# Patient Record
Sex: FEMALE | Race: WHITE | NOT HISPANIC OR LATINO | Employment: OTHER | URBAN - METROPOLITAN AREA
[De-identification: names, ages, dates, MRNs, and addresses within clinical notes are randomized per-mention and may not be internally consistent; named-entity substitution may affect disease eponyms.]

---

## 2023-09-16 PROBLEM — I87.329 STASIS DERMATITIS OF LOWER EXTREMITY DUE TO PERIPHERAL VENOUS HYPERTENSION: Status: ACTIVE | Noted: 2023-09-16

## 2023-09-16 PROBLEM — I82.409 DVT (DEEP VENOUS THROMBOSIS) (MULTI): Status: ACTIVE | Noted: 2023-09-16

## 2023-09-16 PROBLEM — I26.99 PULMONARY EMBOLISM (MULTI): Status: ACTIVE | Noted: 2023-09-16

## 2023-09-16 PROBLEM — M79.89 LEFT LEG SWELLING: Status: ACTIVE | Noted: 2023-09-16

## 2023-09-16 PROBLEM — E66.9 OBESITY: Status: ACTIVE | Noted: 2023-09-16

## 2023-09-16 PROBLEM — I80.9 PHLEBITIS: Status: ACTIVE | Noted: 2023-09-16

## 2023-09-16 PROBLEM — M71.20 BAKERS CYST: Status: ACTIVE | Noted: 2023-09-16

## 2023-09-16 RX ORDER — DOXYCYCLINE HYCLATE 100 MG
100 TABLET ORAL EVERY 12 HOURS
COMMUNITY
Start: 2020-02-07 | End: 2023-10-23

## 2023-09-16 RX ORDER — RIVAROXABAN 20 MG/1
1 TABLET, FILM COATED ORAL DAILY
COMMUNITY
Start: 2019-02-07 | End: 2024-01-05

## 2023-09-16 RX ORDER — ACETAMINOPHEN 325 MG/1
650 TABLET ORAL EVERY 4 HOURS PRN
COMMUNITY
Start: 2020-02-07

## 2023-09-16 RX ORDER — MULTIVITAMIN
0.5 TABLET ORAL 2 TIMES DAILY
COMMUNITY

## 2023-10-13 DIAGNOSIS — M71.20 SYNOVIAL CYST OF POPLITEAL SPACE, UNSPECIFIED LATERALITY: ICD-10-CM

## 2023-10-13 DIAGNOSIS — I87.329 STASIS DERMATITIS OF LOWER EXTREMITY DUE TO PERIPHERAL VENOUS HYPERTENSION: ICD-10-CM

## 2023-10-23 ENCOUNTER — HOSPITAL ENCOUNTER (EMERGENCY)
Facility: HOSPITAL | Age: 83
Discharge: HOME | End: 2023-10-23
Payer: MEDICARE

## 2023-10-23 ENCOUNTER — APPOINTMENT (OUTPATIENT)
Dept: VASCULAR MEDICINE | Facility: HOSPITAL | Age: 83
End: 2023-10-23
Payer: MEDICARE

## 2023-10-23 VITALS
HEIGHT: 62 IN | BODY MASS INDEX: 31.65 KG/M2 | OXYGEN SATURATION: 97 % | HEART RATE: 62 BPM | TEMPERATURE: 98.1 F | RESPIRATION RATE: 18 BRPM | WEIGHT: 172 LBS | SYSTOLIC BLOOD PRESSURE: 188 MMHG | DIASTOLIC BLOOD PRESSURE: 84 MMHG

## 2023-10-23 DIAGNOSIS — S81.801A WOUND OF RIGHT LOWER EXTREMITY, INITIAL ENCOUNTER: Primary | ICD-10-CM

## 2023-10-23 DIAGNOSIS — M79.662 PAIN IN LEFT LOWER LEG: ICD-10-CM

## 2023-10-23 LAB
ALBUMIN SERPL BCP-MCNC: 4.2 G/DL (ref 3.4–5)
ALP SERPL-CCNC: 80 U/L (ref 33–136)
ALT SERPL W P-5'-P-CCNC: 16 U/L (ref 7–45)
ANION GAP SERPL CALC-SCNC: 11 MMOL/L (ref 10–20)
AST SERPL W P-5'-P-CCNC: 18 U/L (ref 9–39)
BASOPHILS # BLD AUTO: 0.06 X10*3/UL (ref 0–0.1)
BASOPHILS NFR BLD AUTO: 1 %
BILIRUB SERPL-MCNC: 0.6 MG/DL (ref 0–1.2)
BUN SERPL-MCNC: 10 MG/DL (ref 6–23)
CALCIUM SERPL-MCNC: 9.5 MG/DL (ref 8.6–10.3)
CHLORIDE SERPL-SCNC: 102 MMOL/L (ref 98–107)
CO2 SERPL-SCNC: 30 MMOL/L (ref 21–32)
CREAT SERPL-MCNC: 0.66 MG/DL (ref 0.5–1.05)
EOSINOPHIL # BLD AUTO: 0.19 X10*3/UL (ref 0–0.4)
EOSINOPHIL NFR BLD AUTO: 3 %
ERYTHROCYTE [DISTWIDTH] IN BLOOD BY AUTOMATED COUNT: 14 % (ref 11.5–14.5)
GFR SERPL CREATININE-BSD FRML MDRD: 88 ML/MIN/1.73M*2
GLUCOSE SERPL-MCNC: 95 MG/DL (ref 74–99)
HCT VFR BLD AUTO: 43.2 % (ref 36–46)
HGB BLD-MCNC: 14.1 G/DL (ref 12–16)
IMM GRANULOCYTES # BLD AUTO: 0.04 X10*3/UL (ref 0–0.5)
IMM GRANULOCYTES NFR BLD AUTO: 0.6 % (ref 0–0.9)
LACTATE SERPL-SCNC: 1.2 MMOL/L (ref 0.4–2)
LYMPHOCYTES # BLD AUTO: 1.01 X10*3/UL (ref 0.8–3)
LYMPHOCYTES NFR BLD AUTO: 16 %
MCH RBC QN AUTO: 30.4 PG (ref 26–34)
MCHC RBC AUTO-ENTMCNC: 32.6 G/DL (ref 32–36)
MCV RBC AUTO: 93 FL (ref 80–100)
MONOCYTES # BLD AUTO: 0.54 X10*3/UL (ref 0.05–0.8)
MONOCYTES NFR BLD AUTO: 8.6 %
NEUTROPHILS # BLD AUTO: 4.46 X10*3/UL (ref 1.6–5.5)
NEUTROPHILS NFR BLD AUTO: 70.8 %
NRBC BLD-RTO: 0 /100 WBCS (ref 0–0)
PLATELET # BLD AUTO: 312 X10*3/UL (ref 150–450)
PMV BLD AUTO: 8.9 FL (ref 7.5–11.5)
POTASSIUM SERPL-SCNC: 4.1 MMOL/L (ref 3.5–5.3)
PROT SERPL-MCNC: 6.7 G/DL (ref 6.4–8.2)
RBC # BLD AUTO: 4.64 X10*6/UL (ref 4–5.2)
SODIUM SERPL-SCNC: 139 MMOL/L (ref 136–145)
WBC # BLD AUTO: 6.3 X10*3/UL (ref 4.4–11.3)

## 2023-10-23 PROCEDURE — 99284 EMERGENCY DEPT VISIT MOD MDM: CPT | Mod: 25

## 2023-10-23 PROCEDURE — 87040 BLOOD CULTURE FOR BACTERIA: CPT | Mod: CMCLAB,PARLAB | Performed by: NURSE PRACTITIONER

## 2023-10-23 PROCEDURE — 96365 THER/PROPH/DIAG IV INF INIT: CPT

## 2023-10-23 PROCEDURE — 2500000004 HC RX 250 GENERAL PHARMACY W/ HCPCS (ALT 636 FOR OP/ED): Performed by: NURSE PRACTITIONER

## 2023-10-23 PROCEDURE — 36415 COLL VENOUS BLD VENIPUNCTURE: CPT | Performed by: NURSE PRACTITIONER

## 2023-10-23 PROCEDURE — 85025 COMPLETE CBC W/AUTO DIFF WBC: CPT | Performed by: NURSE PRACTITIONER

## 2023-10-23 PROCEDURE — 96367 TX/PROPH/DG ADDL SEQ IV INF: CPT

## 2023-10-23 PROCEDURE — 83605 ASSAY OF LACTIC ACID: CPT | Performed by: NURSE PRACTITIONER

## 2023-10-23 PROCEDURE — 96361 HYDRATE IV INFUSION ADD-ON: CPT

## 2023-10-23 PROCEDURE — 93971 EXTREMITY STUDY: CPT | Performed by: SURGERY

## 2023-10-23 PROCEDURE — 93971 EXTREMITY STUDY: CPT

## 2023-10-23 PROCEDURE — 80053 COMPREHEN METABOLIC PANEL: CPT | Performed by: NURSE PRACTITIONER

## 2023-10-23 RX ORDER — DOXYCYCLINE 100 MG/1
100 CAPSULE ORAL 2 TIMES DAILY
Qty: 20 CAPSULE | Refills: 0 | Status: SHIPPED | OUTPATIENT
Start: 2023-10-23 | End: 2023-11-02

## 2023-10-23 RX ORDER — VANCOMYCIN HYDROCHLORIDE 1 G/200ML
1000 INJECTION, SOLUTION INTRAVENOUS ONCE
Status: COMPLETED | OUTPATIENT
Start: 2023-10-23 | End: 2023-10-23

## 2023-10-23 RX ORDER — SODIUM CHLORIDE 9 MG/ML
75 INJECTION, SOLUTION INTRAVENOUS CONTINUOUS
Status: DISCONTINUED | OUTPATIENT
Start: 2023-10-23 | End: 2023-10-23 | Stop reason: HOSPADM

## 2023-10-23 RX ORDER — CEFEPIME 1 G/50ML
2 INJECTION, SOLUTION INTRAVENOUS ONCE
Status: DISCONTINUED | OUTPATIENT
Start: 2023-10-23 | End: 2023-10-23

## 2023-10-23 RX ADMIN — SODIUM CHLORIDE 75 ML/HR: 9 INJECTION, SOLUTION INTRAVENOUS at 13:32

## 2023-10-23 RX ADMIN — SODIUM CHLORIDE 500 ML: 9 INJECTION, SOLUTION INTRAVENOUS at 13:05

## 2023-10-23 RX ADMIN — VANCOMYCIN HYDROCHLORIDE 1000 MG: 1 INJECTION, SOLUTION INTRAVENOUS at 14:26

## 2023-10-23 RX ADMIN — CEFEPIME 2 G: 2 INJECTION, POWDER, FOR SOLUTION INTRAVENOUS at 13:50

## 2023-10-23 ASSESSMENT — COLUMBIA-SUICIDE SEVERITY RATING SCALE - C-SSRS
2. HAVE YOU ACTUALLY HAD ANY THOUGHTS OF KILLING YOURSELF?: NO
1. IN THE PAST MONTH, HAVE YOU WISHED YOU WERE DEAD OR WISHED YOU COULD GO TO SLEEP AND NOT WAKE UP?: NO
6. HAVE YOU EVER DONE ANYTHING, STARTED TO DO ANYTHING, OR PREPARED TO DO ANYTHING TO END YOUR LIFE?: NO

## 2023-10-23 NOTE — ED PROVIDER NOTES
"Limitations to History: None     HPI:      Fortino Lopez is a 83 y.o.  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  Patient was due to have routine follow-up with Dr. Vilchis upcoming.  She was advised to get an ultrasound of the left lower extremity but has not been able to complete that.  Patient noticed over the last couple days the left leg was not feeling \"normal.\"  Denies any trauma.  Patient states she has been doctoring a wound on the right lower extremity herself over the last several weeks and would like that evaluated.  Denies fever/chills, cough/cold symptoms, chest pain, shortness of breath, nausea/vomiting, abdominal pain, urinary symptoms, change in bowel habits or any other complaints.    Additional History Obtained from: None    ------------------------------------------------------------------------------------------------------------------------------------------    VS: As documented in the triage note and EMR flowsheet from this visit were reviewed.    Physical Exam:  Gen: Pleasant 83-year-old  female, nontoxic looking.  Alert and oriented x3.  Pleasant and cooperative.  Well-hydrated and nourished.  Head/Neck: NCAT, neck w/ FROM  Eyes: EOMI, PERRL, anicteric sclerae, noninjected conjunctivae  Ears: TMs clear b/l without sign of infection  Nose: Nares patent w/o rhinorrhea  Mouth:  MMM, no OP lesions noted  Heart: RRR no MRG  Lungs: CTA b/l no RRW, no increased work of breathing  Abdomen: soft, NT, ND, no HSM, no palpable masses  Musculoskeletal: Left lower extremity is not edematous, no erythema or palpable cords.  No tenderness with palpation.  See skin section for wound description on right lower extremity.  MSPs intact.  No deformities.  No cyanosis or clubbing.  Pulses full and equal.  Neurologic: No focal neurological deficits.  Symmetrical " facies, phonates clearly, moves all extremities equally, responsive to touch, ambulates normally   Skin: Large wound on the anterior right lower leg that is erythematous with purulent drainage, no lymphangitic streaking.        ------------------------------------------------------------------------------------------------------------------------------------------    Medical Decision Making:     ED Course as of 10/23/23 1407   Mon Oct 23, 2023   1257 83-year-old  female who is on Xarelto for prior DVT in the left lower extremity is evaluated at the bedside for another DVT in the left lower extremity as it has been uncomfortable the last couple days.  On arrival to the ED, awake and alert, blood pressure elevated to 213/97, this will be reevaluated.  Heart rate 80.  O2 sat 94%.  Afebrile.  Patient stated to me that she is doctoring a wound on her right lower extremity for several weeks and would like that checked.  The right anterior lower leg is erythematous with purulent drainage.  IV established, basic labs including lactate with antibiotics and fluids. [SB]   1406 Preliminary result on ultrasound shows no evidence of DVT left lower extremity.  Laboratory studies were also reviewed, no leukocytosis or evidence of anemia.  Normal kidney function, electrolytes, LFTs and lactate.  Repeat vital signs within normal limits.  Resting comfortably.  With normal lab work and vital signs, I feel comfortable discharging the patient home on doxycycline twice a day x10 days.  Wound was washed with Hibiclens and saline, bacitracin and nonstick dressing applied.  Patient is discharged home and will follow up with Dr. Vilchis as well as the wound center in the next 2 to 3 days.  Advised to resume any normal medications.  Wound care discussed.  Return precautions discussed.  Diagnosis, treatment and plan discussed with patient, she verbalizes understanding and is in agreement.  Condition stable for discharge. [SB]      ED  Course User Index  [SB] HORACIO Martinez       EKG not ordered    Chronic Medical Conditions Significantly Affecting Care: None    External Records Reviewed: None    Discussion of Management with Other Providers: None         HORACIO Martinez  10/23/23 5743

## 2023-10-23 NOTE — ED TRIAGE NOTES
Secondary to patient volumes and overcrowding, I performed a brief medical screening exam of the patient in triage, as the patient awaits space in the main ED.    History of Present Illness:  Fortino Lopez presents with left posterior thigh pain.  Patient states his reminds her of when she had a blood clot in her legs.  Patient denies any chest pain or shortness of breath.  Patient is taking Xarelto and states compliance with this medication.    Physical Exam:  General - In no acute distress  Respiratory - Breathing comfortably  Neurologic - Moving all extremities  MSK -left posterior thigh pain    Medical Decision Making:  Patient will require further evaluation in the main ED.    Initial diagnostic tests were ordered from triage.    The patient demonstrates understanding that this initial evaluation is a brief medical screening exam and the expectation is that they await for space in the main ED to be further evaluated.  The patient understands that, if they leave prior to further evaluation in the main ED after this initial evaluation in triage, they are doing so under their own accord knowing that their evaluation/work-up is not yet complete. The patient also understands that any preliminary diagnostic results, including abnormalities, may not be shared with them, if they choose to leave prior to further evaluation in the main ED.

## 2023-10-26 ENCOUNTER — LAB REQUISITION (OUTPATIENT)
Dept: LAB | Facility: HOSPITAL | Age: 83
End: 2023-10-26
Payer: MEDICARE

## 2023-10-26 ENCOUNTER — OFFICE VISIT (OUTPATIENT)
Dept: WOUND CARE | Facility: CLINIC | Age: 83
End: 2023-10-26
Payer: MEDICARE

## 2023-10-26 PROCEDURE — 87075 CULTR BACTERIA EXCEPT BLOOD: CPT

## 2023-10-26 PROCEDURE — 87205 SMEAR GRAM STAIN: CPT

## 2023-10-26 PROCEDURE — 87070 CULTURE OTHR SPECIMN AEROBIC: CPT

## 2023-10-26 PROCEDURE — 87070 CULTURE OTHR SPECIMN AEROBIC: CPT | Mod: OUT | Performed by: INTERNAL MEDICINE

## 2023-10-26 PROCEDURE — 87186 SC STD MICRODIL/AGAR DIL: CPT

## 2023-10-26 PROCEDURE — 99212 OFFICE O/P EST SF 10 MIN: CPT

## 2023-10-26 PROCEDURE — 29580 STRAPPING UNNA BOOT: CPT | Mod: RT

## 2023-10-27 LAB
BACTERIA BLD CULT: NORMAL
BACTERIA BLD CULT: NORMAL

## 2023-10-29 LAB
BACTERIA SPEC CULT: ABNORMAL
GRAM STN SPEC: ABNORMAL
GRAM STN SPEC: ABNORMAL

## 2023-11-02 ENCOUNTER — CLINICAL SUPPORT (OUTPATIENT)
Dept: WOUND CARE | Facility: CLINIC | Age: 83
End: 2023-11-02
Payer: MEDICARE

## 2023-12-30 DIAGNOSIS — I82.4Y9 DEEP VEIN THROMBOSIS (DVT) OF PROXIMAL LOWER EXTREMITY, UNSPECIFIED CHRONICITY, UNSPECIFIED LATERALITY (MULTI): ICD-10-CM

## 2024-01-05 RX ORDER — RIVAROXABAN 20 MG/1
20 TABLET, FILM COATED ORAL DAILY
Qty: 30 TABLET | Refills: 0 | Status: SHIPPED | OUTPATIENT
Start: 2024-01-05 | End: 2024-02-01 | Stop reason: SDUPTHER

## 2024-02-01 ENCOUNTER — OFFICE VISIT (OUTPATIENT)
Dept: VASCULAR SURGERY | Facility: CLINIC | Age: 84
End: 2024-02-01
Payer: MEDICARE

## 2024-02-01 VITALS
SYSTOLIC BLOOD PRESSURE: 128 MMHG | WEIGHT: 177 LBS | BODY MASS INDEX: 32.57 KG/M2 | DIASTOLIC BLOOD PRESSURE: 82 MMHG | HEIGHT: 62 IN | HEART RATE: 82 BPM

## 2024-02-01 DIAGNOSIS — I82.402 DEEP VEIN THROMBOSIS (DVT) OF LEFT LOWER EXTREMITY, UNSPECIFIED CHRONICITY, UNSPECIFIED VEIN (MULTI): Primary | ICD-10-CM

## 2024-02-01 DIAGNOSIS — I82.4Y9 DEEP VEIN THROMBOSIS (DVT) OF PROXIMAL LOWER EXTREMITY, UNSPECIFIED CHRONICITY, UNSPECIFIED LATERALITY (MULTI): ICD-10-CM

## 2024-02-01 DIAGNOSIS — M79.89 LEFT LEG SWELLING: ICD-10-CM

## 2024-02-01 PROCEDURE — 1159F MED LIST DOCD IN RCRD: CPT | Performed by: SURGERY

## 2024-02-01 PROCEDURE — 1160F RVW MEDS BY RX/DR IN RCRD: CPT | Performed by: SURGERY

## 2024-02-01 PROCEDURE — 99214 OFFICE O/P EST MOD 30 MIN: CPT | Performed by: SURGERY

## 2024-02-01 ASSESSMENT — LIFESTYLE VARIABLES: HOW OFTEN DO YOU HAVE A DRINK CONTAINING ALCOHOL: 2-4 TIMES A MONTH

## 2024-02-01 NOTE — PROGRESS NOTES
"Fortino Lopez is a 83 y.o. female     Subjective   This patient presents today for follow-up of her venous issues of her lower extremities.  She states that her legs are status quo.  She does get occasional achiness and heaviness.  She does have a history of DVT x 2 I believe both were in the left leg.  She quit smoking 40+ years ago.  She is 5 foot 2 and weighs 177 pounds.  She states that she has gained some weight over the holidays.  She is working to lose weight now.  She currently denies any fever chills nausea vomiting or headache.  She denies any sudden onset of acute shortness of breath for.         Objective     Vitals:    02/01/24 0937   BP: 128/82   Pulse: 82      Physical Exam  This patient is alert and oriented x 3.  She is in no acute distress.  Head is normocephalic.  Pupils are equal and reactive to light and accommodation.  Neck is soft and supple without palpable lymph nodes.  Heart is regular rate.  Lungs are relatively clear.  Abdomen is obese with positive bowel sounds there is no pain on palpation.  Upper extremities seem to have adequate range of motion.  Her lower extremities have some decreased range of motion.  She does have palpable brachial radial and femoral pulses.  Skin turgor is somewhat decreased in her lower extremities.  Psychologically she seems to be acting appropriately.  She does have fairly significant stasis dermatitis of both lower extremities and she does have some mild to slightly moderate swelling of both calves.  Blood pressure 128/82, pulse 82, height 1.575 m (5' 2\"), weight 80.3 kg (177 lb).            Patient Active Problem List    Diagnosis Date Noted    Bakers cyst 09/16/2023    DVT (deep venous thrombosis) (CMS/Formerly [facility]) 09/16/2023    Left leg swelling 09/16/2023    Obesity 09/16/2023    Phlebitis 09/16/2023    Stasis dermatitis of lower extremity due to peripheral venous hypertension 09/16/2023    Pulmonary embolism (CMS/Former[facility]) 09/16/2023          Current Outpatient " Medications:     acetaminophen (Tylenol) 325 mg tablet, Take 2 tablets (650 mg) by mouth every 4 hours if needed for fever (temp greater than 38.0 C)., Disp: , Rfl:     multivitamin tablet, Take 0.5 tablets by mouth twice a day., Disp: , Rfl:     rivaroxaban (Xarelto) 15 mg tablet, Take 1 tablet (15 mg) by mouth 2 times a day with meals., Disp: , Rfl:     vitamins B1 B6 B12 liquid, Take by mouth., Disp: , Rfl:     Xarelto 20 mg tablet, TAKE ONE TABLET BY MOUTH EVERY DAY, Disp: 30 tablet, Rfl: 0     Lab Results   Component Value Date    WBC 6.3 10/23/2023    HGB 14.1 10/23/2023    HCT 43.2 10/23/2023     10/23/2023    ALT 16 10/23/2023    AST 18 10/23/2023     10/23/2023    K 4.1 10/23/2023     10/23/2023    CREATININE 0.66 10/23/2023    BUN 10 10/23/2023    CO2 30 10/23/2023    INR 1.3 (H) 02/05/2020       Lower extremity venous duplex left    Result Date: 10/23/2023           [place] [address] Tel [phone] and Fax [phone]  Vascular Lab Report Lower Venous Duplex Ultrasound  Patient Name:     SPENCER NEUMANN    Reading           [id number] [address]                                       Physician:        MD Study Date:       10/23/2023          Ordering          [id number] TYLER REESE                                       Provider: MRN/PID:          [id number]            Technologist:     Vi Crowe T Accession#:       [id number]        Technologist 2: Date of           1940 / 83      Encounter#:       [id number] Birth/Age:        years Gender:           F Admission Status: Emergency           Location          [facility]                                       Performed:  Diagnosis/ICD: Pain in left lower leg-M79.662 Indication:    Limb pain.  CONCLUSIONS: Right Lower Venous: Right common femoral vein is negative for deep vein thrombus. Left Lower Venous: No evidence of acute deep vein thrombus visualized in the left lower  extremity. Cannot rule out thrombus in non-visualized peroneal vein due to body habitus.  Comparison: Compared with study from 7/13/2021, no significant change.noted within the left lower extremity.  Imaging & Doppler Findings:  Right Compressible Thrombus        Flow CFV       Yes        None   Spontaneous/Phasic  Left                  Compress Thrombus        Flow Distal External Iliac   Yes      None   Spontaneous/Phasic CFV                     Yes      None   Spontaneous/Phasic PFV                     Yes      None FV Proximal             Yes      None   Spontaneous/Phasic FV Mid                  Yes      None FV Distal               Yes      None Popliteal               Yes      None   Spontaneous/Phasic PTV                     Yes      None  [id number] Bao Dominguez MD Electronically signed by [id number] Bao Dominguez MD on 10/23/2023 at 4:06:30 PM  ** Final **                 Assessment/Plan   Active Problems:  There are no active Hospital Problems.      This patient presents today for follow-up of her venous issues of her lower extremities.  She does have a history of DVT of her left lower extremity I believe x 2.  She is lifelong anticoagulation with Xarelto.  On physical exam, she does have mild to slightly moderate amount of swelling of both calves and does have stasis dermatitis.  I am encouraging her to wear her compression stockings daily basis.  Weight loss would definitely benefit her.  She is periodically elevate her legs above her heart and continue to take the Xarelto.  I would like her to have a venous duplex scan with reflux study and follow-up with me in 6 months.      Salty Vilchis, DO

## 2024-06-06 DIAGNOSIS — I82.4Y9 DEEP VEIN THROMBOSIS (DVT) OF PROXIMAL LOWER EXTREMITY, UNSPECIFIED CHRONICITY, UNSPECIFIED LATERALITY (MULTI): ICD-10-CM

## 2024-06-06 RX ORDER — RIVAROXABAN 20 MG/1
20 TABLET, FILM COATED ORAL DAILY
Qty: 30 TABLET | Refills: 3 | Status: SHIPPED | OUTPATIENT
Start: 2024-06-06

## 2024-06-27 ENCOUNTER — APPOINTMENT (OUTPATIENT)
Dept: VASCULAR SURGERY | Facility: CLINIC | Age: 84
End: 2024-06-27
Payer: MEDICARE

## 2024-08-02 ENCOUNTER — HOSPITAL ENCOUNTER (OUTPATIENT)
Dept: VASCULAR MEDICINE | Facility: HOSPITAL | Age: 84
Discharge: HOME | End: 2024-08-02
Payer: MEDICARE

## 2024-08-02 DIAGNOSIS — I87.2 VENOUS INSUFFICIENCY (CHRONIC) (PERIPHERAL): ICD-10-CM

## 2024-08-02 DIAGNOSIS — M79.89 LEFT LEG SWELLING: ICD-10-CM

## 2024-08-02 DIAGNOSIS — I82.409 DVT (DEEP VENOUS THROMBOSIS) (MULTI): ICD-10-CM

## 2024-08-02 PROCEDURE — 93970 EXTREMITY STUDY: CPT

## 2024-08-02 PROCEDURE — 93970 EXTREMITY STUDY: CPT | Performed by: INTERNAL MEDICINE

## 2024-08-15 ENCOUNTER — APPOINTMENT (OUTPATIENT)
Dept: VASCULAR SURGERY | Facility: CLINIC | Age: 84
End: 2024-08-15
Payer: MEDICARE

## 2024-10-10 DIAGNOSIS — I82.4Y9 DEEP VEIN THROMBOSIS (DVT) OF PROXIMAL LOWER EXTREMITY, UNSPECIFIED CHRONICITY, UNSPECIFIED LATERALITY (MULTI): ICD-10-CM

## 2024-10-10 RX ORDER — RIVAROXABAN 20 MG/1
20 TABLET, FILM COATED ORAL DAILY
Qty: 30 TABLET | Refills: 0 | Status: SHIPPED | OUTPATIENT
Start: 2024-10-10

## 2024-10-16 ENCOUNTER — HOSPITAL ENCOUNTER (EMERGENCY)
Facility: HOSPITAL | Age: 84
Discharge: HOME | End: 2024-10-17
Attending: EMERGENCY MEDICINE
Payer: MEDICARE

## 2024-10-16 ENCOUNTER — APPOINTMENT (OUTPATIENT)
Dept: RADIOLOGY | Facility: HOSPITAL | Age: 84
End: 2024-10-16
Payer: MEDICARE

## 2024-10-16 DIAGNOSIS — R07.9 CHEST PAIN, UNSPECIFIED TYPE: Primary | ICD-10-CM

## 2024-10-16 LAB
BASOPHILS # BLD AUTO: 0.06 X10*3/UL (ref 0–0.1)
BASOPHILS NFR BLD AUTO: 0.6 %
EOSINOPHIL # BLD AUTO: 0.11 X10*3/UL (ref 0–0.4)
EOSINOPHIL NFR BLD AUTO: 1.1 %
ERYTHROCYTE [DISTWIDTH] IN BLOOD BY AUTOMATED COUNT: 14.1 % (ref 11.5–14.5)
HCT VFR BLD AUTO: 41.1 % (ref 36–46)
HGB BLD-MCNC: 13.5 G/DL (ref 12–16)
IMM GRANULOCYTES # BLD AUTO: 0.02 X10*3/UL (ref 0–0.5)
IMM GRANULOCYTES NFR BLD AUTO: 0.2 % (ref 0–0.9)
LYMPHOCYTES # BLD AUTO: 0.83 X10*3/UL (ref 0.8–3)
LYMPHOCYTES NFR BLD AUTO: 8.1 %
MCH RBC QN AUTO: 30.1 PG (ref 26–34)
MCHC RBC AUTO-ENTMCNC: 32.8 G/DL (ref 32–36)
MCV RBC AUTO: 92 FL (ref 80–100)
MONOCYTES # BLD AUTO: 0.87 X10*3/UL (ref 0.05–0.8)
MONOCYTES NFR BLD AUTO: 8.5 %
NEUTROPHILS # BLD AUTO: 8.32 X10*3/UL (ref 1.6–5.5)
NEUTROPHILS NFR BLD AUTO: 81.5 %
NRBC BLD-RTO: 0 /100 WBCS (ref 0–0)
PLATELET # BLD AUTO: 286 X10*3/UL (ref 150–450)
RBC # BLD AUTO: 4.49 X10*6/UL (ref 4–5.2)
WBC # BLD AUTO: 10.2 X10*3/UL (ref 4.4–11.3)

## 2024-10-16 PROCEDURE — 93005 ELECTROCARDIOGRAM TRACING: CPT

## 2024-10-16 PROCEDURE — 83735 ASSAY OF MAGNESIUM: CPT | Performed by: EMERGENCY MEDICINE

## 2024-10-16 PROCEDURE — 84484 ASSAY OF TROPONIN QUANT: CPT | Performed by: EMERGENCY MEDICINE

## 2024-10-16 PROCEDURE — 74177 CT ABD & PELVIS W/CONTRAST: CPT

## 2024-10-16 PROCEDURE — 2500000004 HC RX 250 GENERAL PHARMACY W/ HCPCS (ALT 636 FOR OP/ED): Performed by: EMERGENCY MEDICINE

## 2024-10-16 PROCEDURE — 80053 COMPREHEN METABOLIC PANEL: CPT | Performed by: EMERGENCY MEDICINE

## 2024-10-16 PROCEDURE — 85610 PROTHROMBIN TIME: CPT | Performed by: EMERGENCY MEDICINE

## 2024-10-16 PROCEDURE — 83880 ASSAY OF NATRIURETIC PEPTIDE: CPT | Performed by: EMERGENCY MEDICINE

## 2024-10-16 PROCEDURE — 71045 X-RAY EXAM CHEST 1 VIEW: CPT

## 2024-10-16 PROCEDURE — 99285 EMERGENCY DEPT VISIT HI MDM: CPT | Mod: 25

## 2024-10-16 PROCEDURE — 85025 COMPLETE CBC W/AUTO DIFF WBC: CPT | Performed by: EMERGENCY MEDICINE

## 2024-10-16 PROCEDURE — 71275 CT ANGIOGRAPHY CHEST: CPT

## 2024-10-16 PROCEDURE — 71045 X-RAY EXAM CHEST 1 VIEW: CPT | Performed by: RADIOLOGY

## 2024-10-16 PROCEDURE — 36415 COLL VENOUS BLD VENIPUNCTURE: CPT | Performed by: EMERGENCY MEDICINE

## 2024-10-16 RX ORDER — NAPROXEN SODIUM 220 MG/1
TABLET, FILM COATED ORAL
Status: DISCONTINUED
Start: 2024-10-16 | End: 2024-10-17 | Stop reason: HOSPADM

## 2024-10-16 RX ADMIN — SODIUM CHLORIDE 500 ML: 9 INJECTION, SOLUTION INTRAVENOUS at 23:50

## 2024-10-16 ASSESSMENT — PAIN - FUNCTIONAL ASSESSMENT: PAIN_FUNCTIONAL_ASSESSMENT: 0-10

## 2024-10-16 ASSESSMENT — PAIN DESCRIPTION - LOCATION: LOCATION: CHEST

## 2024-10-16 ASSESSMENT — LIFESTYLE VARIABLES
TOTAL SCORE: 0
EVER FELT BAD OR GUILTY ABOUT YOUR DRINKING: NO
EVER HAD A DRINK FIRST THING IN THE MORNING TO STEADY YOUR NERVES TO GET RID OF A HANGOVER: NO
HAVE YOU EVER FELT YOU SHOULD CUT DOWN ON YOUR DRINKING: NO
HAVE PEOPLE ANNOYED YOU BY CRITICIZING YOUR DRINKING: NO

## 2024-10-16 ASSESSMENT — PAIN DESCRIPTION - DESCRIPTORS: DESCRIPTORS: ACHING

## 2024-10-16 ASSESSMENT — COLUMBIA-SUICIDE SEVERITY RATING SCALE - C-SSRS
6. HAVE YOU EVER DONE ANYTHING, STARTED TO DO ANYTHING, OR PREPARED TO DO ANYTHING TO END YOUR LIFE?: NO
1. IN THE PAST MONTH, HAVE YOU WISHED YOU WERE DEAD OR WISHED YOU COULD GO TO SLEEP AND NOT WAKE UP?: NO
2. HAVE YOU ACTUALLY HAD ANY THOUGHTS OF KILLING YOURSELF?: NO

## 2024-10-16 ASSESSMENT — PAIN SCALES - GENERAL: PAINLEVEL_OUTOF10: 2

## 2024-10-17 ENCOUNTER — APPOINTMENT (OUTPATIENT)
Dept: RADIOLOGY | Facility: HOSPITAL | Age: 84
End: 2024-10-17
Payer: MEDICARE

## 2024-10-17 ENCOUNTER — APPOINTMENT (OUTPATIENT)
Dept: CARDIOLOGY | Facility: HOSPITAL | Age: 84
End: 2024-10-17
Payer: MEDICARE

## 2024-10-17 VITALS
WEIGHT: 173 LBS | BODY MASS INDEX: 31.83 KG/M2 | SYSTOLIC BLOOD PRESSURE: 169 MMHG | HEART RATE: 91 BPM | HEIGHT: 62 IN | RESPIRATION RATE: 18 BRPM | DIASTOLIC BLOOD PRESSURE: 74 MMHG | OXYGEN SATURATION: 94 % | TEMPERATURE: 99 F

## 2024-10-17 LAB
ALBUMIN SERPL BCP-MCNC: 3.9 G/DL (ref 3.4–5)
ALP SERPL-CCNC: 67 U/L (ref 33–136)
ALT SERPL W P-5'-P-CCNC: 15 U/L (ref 7–45)
ANION GAP SERPL CALC-SCNC: 15 MMOL/L (ref 10–20)
AST SERPL W P-5'-P-CCNC: 16 U/L (ref 9–39)
BILIRUB SERPL-MCNC: 0.4 MG/DL (ref 0–1.2)
BNP SERPL-MCNC: 46 PG/ML (ref 0–99)
BUN SERPL-MCNC: 15 MG/DL (ref 6–23)
CALCIUM SERPL-MCNC: 8.8 MG/DL (ref 8.6–10.3)
CARDIAC TROPONIN I PNL SERPL HS: 6 NG/L (ref 0–13)
CARDIAC TROPONIN I PNL SERPL HS: 7 NG/L (ref 0–13)
CHLORIDE SERPL-SCNC: 102 MMOL/L (ref 98–107)
CO2 SERPL-SCNC: 24 MMOL/L (ref 21–32)
CREAT SERPL-MCNC: 0.59 MG/DL (ref 0.5–1.05)
EGFRCR SERPLBLD CKD-EPI 2021: 90 ML/MIN/1.73M*2
GLUCOSE SERPL-MCNC: 115 MG/DL (ref 74–99)
INR PPP: 1.1 (ref 0.9–1.1)
MAGNESIUM SERPL-MCNC: 1.98 MG/DL (ref 1.6–2.4)
POTASSIUM SERPL-SCNC: 3.8 MMOL/L (ref 3.5–5.3)
PROT SERPL-MCNC: 6.6 G/DL (ref 6.4–8.2)
PROTHROMBIN TIME: 12.5 SECONDS (ref 9.8–12.8)
SODIUM SERPL-SCNC: 137 MMOL/L (ref 136–145)

## 2024-10-17 PROCEDURE — 84484 ASSAY OF TROPONIN QUANT: CPT | Performed by: EMERGENCY MEDICINE

## 2024-10-17 PROCEDURE — 74177 CT ABD & PELVIS W/CONTRAST: CPT | Performed by: STUDENT IN AN ORGANIZED HEALTH CARE EDUCATION/TRAINING PROGRAM

## 2024-10-17 PROCEDURE — 96360 HYDRATION IV INFUSION INIT: CPT

## 2024-10-17 PROCEDURE — 71275 CT ANGIOGRAPHY CHEST: CPT | Performed by: STUDENT IN AN ORGANIZED HEALTH CARE EDUCATION/TRAINING PROGRAM

## 2024-10-17 PROCEDURE — 2550000001 HC RX 255 CONTRASTS: Performed by: EMERGENCY MEDICINE

## 2024-10-17 PROCEDURE — 36415 COLL VENOUS BLD VENIPUNCTURE: CPT | Performed by: EMERGENCY MEDICINE

## 2024-10-17 RX ADMIN — IOHEXOL 75 ML: 350 INJECTION, SOLUTION INTRAVENOUS at 00:51

## 2024-10-17 NOTE — ED PROVIDER NOTES
HPI   Chief Complaint   Patient presents with   • Chest Pain     Chest pain that radiates into bilat shoulders since 7p tonight from home. Pt a/ox4. Mild sob 90% on RA. No N/V. No dizzy/loc/fall. Pain has improved       84-year-old female who is brought in by squad for chest pain.  Patient states her chest pain began across her chest into her shoulders this started around 7 PM this evening.  She denies any history of coronary artery disease.  She does have a history of DVT on Xarelto.  Denies any shortness of breath though her pulse ox is 90% on room air.  She does not wear oxygen at home.  Squad did give her aspirin and nitro prior to arrival.              Patient History   Past Medical History:   Diagnosis Date   • Personal history of other diseases of the circulatory system 11/27/2017    History of stasis dermatitis   • Personal history of other diseases of the circulatory system     History of hypertension     No past surgical history on file.  No family history on file.  Social History     Tobacco Use   • Smoking status: Former     Types: Cigarettes   • Smokeless tobacco: Not on file   Substance Use Topics   • Alcohol use: Not on file   • Drug use: Never       Physical Exam   ED Triage Vitals [10/16/24 2342]   Temperature Heart Rate Respirations BP   37.2 °C (99 °F) (!) 102 18 94/59      Pulse Ox Temp Source Heart Rate Source Patient Position   (!) 91 % Temporal Monitor --      BP Location FiO2 (%)     -- --       Physical Exam  Constitutional:       Appearance: Normal appearance. She is normal weight.   HENT:      Head: Normocephalic and atraumatic.      Nose: Nose normal.      Mouth/Throat:      Mouth: Mucous membranes are moist.      Pharynx: Oropharynx is clear.   Eyes:      Extraocular Movements: Extraocular movements intact.      Conjunctiva/sclera: Conjunctivae normal.      Pupils: Pupils are equal, round, and reactive to light.   Cardiovascular:      Rate and Rhythm: Normal rate and regular rhythm.    Pulmonary:      Effort: Pulmonary effort is normal.      Breath sounds: Normal breath sounds.   Abdominal:      General: Abdomen is flat. Bowel sounds are normal.      Palpations: Abdomen is soft.   Musculoskeletal:         General: Normal range of motion.      Cervical back: Normal range of motion and neck supple.   Skin:     General: Skin is warm and dry.      Capillary Refill: Capillary refill takes less than 2 seconds.   Neurological:      General: No focal deficit present.      Mental Status: She is alert.   Psychiatric:         Mood and Affect: Mood normal.         Behavior: Behavior normal.         Thought Content: Thought content normal.         Judgment: Judgment normal.       Labs Reviewed   CBC WITH AUTO DIFFERENTIAL - Abnormal       Result Value    WBC 10.2      nRBC 0.0      RBC 4.49      Hemoglobin 13.5      Hematocrit 41.1      MCV 92      MCH 30.1      MCHC 32.8      RDW 14.1      Platelets 286      Neutrophils % 81.5      Immature Granulocytes %, Automated 0.2      Lymphocytes % 8.1      Monocytes % 8.5      Eosinophils % 1.1      Basophils % 0.6      Neutrophils Absolute 8.32 (*)     Immature Granulocytes Absolute, Automated 0.02      Lymphocytes Absolute 0.83      Monocytes Absolute 0.87 (*)     Eosinophils Absolute 0.11      Basophils Absolute 0.06     COMPREHENSIVE METABOLIC PANEL - Abnormal    Glucose 115 (*)     Sodium 137      Potassium 3.8      Chloride 102      Bicarbonate 24      Anion Gap 15      Urea Nitrogen 15      Creatinine 0.59      eGFR 90      Calcium 8.8      Albumin 3.9      Alkaline Phosphatase 67      Total Protein 6.6      AST 16      Bilirubin, Total 0.4      ALT 15     MAGNESIUM - Normal    Magnesium 1.98     PROTIME-INR - Normal    Protime 12.5      INR 1.1     SERIAL TROPONIN-INITIAL - Normal    Troponin I, High Sensitivity 7      Narrative:     Less than 99th percentile of normal range cutoff-  Female and children under 18 years old <14 ng/L; Male <21 ng/L:  Negative  Repeat testing should be performed if clinically indicated.     Female and children under 18 years old 14-50 ng/L; Male 21-50 ng/L:  Consistent with possible cardiac damage and possible increased clinical   risk. Serial measurements may help to assess extent of myocardial damage.     >50 ng/L: Consistent with cardiac damage, increased clinical risk and  myocardial infarction. Serial measurements may help assess extent of   myocardial damage.      NOTE: Children less than 1 year old may have higher baseline troponin   levels and results should be interpreted in conjunction with the overall   clinical context.     NOTE: Troponin I testing is performed using a different   testing methodology at [facility] than at other   [facility]. Direct result comparisons should only   be made within the same method.   TROPONIN SERIES- (INITIAL, 1 HR)    Narrative:     The following orders were created for panel order Troponin I Series, High Sensitivity (0, 1 HR).  Procedure                               Abnormality         Status                     ---------                               -----------         ------                     Troponin I, High Sensiti...[[id number]]  Normal              Final result               Troponin, High Sensitivi...[[id number]]                                                   Please view results for these tests on the individual orders.   SERIAL TROPONIN, 1 HOUR   B-TYPE NATRIURETIC PEPTIDE       XR chest 1 view   Final Result   No radiographic evidence of acute cardiopulmonary pathology.        MACRO:   None.        Signed by: Evan Finkelstein 10/17/2024 12:13 AM   Dictation workstation:   [id number]      CT angio chest for pulmonary embolism    (Results Pending)           ED Course & MDM   Diagnoses as of 10/17/24 0041   Chest pain, unspecified type                 No data recorded     Efren Coma Scale Score: 15 (10/16/24 2343 : Lety Dan RN)                            Medical Decision Making  Emergency department course, EKG was obtained showed a sinus tachycardia at a rate of 106.  Laboratory studies were obtained and reviewed first high-sensitivity troponin is negative at 7.  Chest x-ray shows no acute cardiopulmonary findings.  CTA of the chest abdomen pelvis will be obtained.    Amount and/or Complexity of Data Reviewed  ECG/medicine tests: independent interpretation performed.     Details: EKG shows a sinus tachycardia at a rate of 106 with nonspecific ST-T wave changes, interpreted by ED physician.        Procedure  Procedures     Brenda Brown DO  10/17/24 0041

## 2024-10-17 NOTE — PROGRESS NOTES
Patient was signed out to me with second troponin pending.  Second troponin is normal.  CT PE study showed no acute pathology.  CT abdomen pelvis unremarkable just chronic renal cyst that have increased in size.  Patient is asymptomatic currently.  She states that she probably overdid it at the [facility] today.  She was doing some extra resistance exercises of her upper body.  I will give her a primary care provider to follow-up with.  Repeat pulse oximetry was 94% on room air.  She has no dyspnea.  Patient will be discharged.

## 2024-10-18 LAB
ATRIAL RATE: 105 BPM
P AXIS: 14 DEGREES
PR INTERVAL: 198 MS
Q ONSET: 252 MS
QRS COUNT: 17 BEATS
QRS DURATION: 77 MS
QT INTERVAL: 353 MS
QTC CALCULATION(BAZETT): 467 MS
QTC FREDERICIA: 425 MS
R AXIS: -26 DEGREES
T AXIS: 70 DEGREES
T OFFSET: 428 MS
VENTRICULAR RATE: 105 BPM

## 2024-10-31 ENCOUNTER — APPOINTMENT (OUTPATIENT)
Dept: VASCULAR SURGERY | Facility: CLINIC | Age: 84
End: 2024-10-31
Payer: MEDICARE

## 2024-10-31 VITALS
OXYGEN SATURATION: 96 % | HEIGHT: 62 IN | DIASTOLIC BLOOD PRESSURE: 74 MMHG | WEIGHT: 177 LBS | SYSTOLIC BLOOD PRESSURE: 132 MMHG | HEART RATE: 82 BPM | BODY MASS INDEX: 32.57 KG/M2

## 2024-10-31 DIAGNOSIS — I80.9 PHLEBITIS: ICD-10-CM

## 2024-10-31 DIAGNOSIS — I87.329 STASIS DERMATITIS OF LOWER EXTREMITY DUE TO PERIPHERAL VENOUS HYPERTENSION: Primary | ICD-10-CM

## 2024-10-31 DIAGNOSIS — M79.89 LEFT LEG SWELLING: ICD-10-CM

## 2024-10-31 PROCEDURE — 1159F MED LIST DOCD IN RCRD: CPT | Performed by: SURGERY

## 2024-10-31 PROCEDURE — 99214 OFFICE O/P EST MOD 30 MIN: CPT | Performed by: SURGERY

## 2024-11-16 DIAGNOSIS — I82.4Y9 DEEP VEIN THROMBOSIS (DVT) OF PROXIMAL LOWER EXTREMITY, UNSPECIFIED CHRONICITY, UNSPECIFIED LATERALITY (MULTI): ICD-10-CM

## 2024-11-18 RX ORDER — RIVAROXABAN 20 MG/1
20 TABLET, FILM COATED ORAL DAILY
Qty: 30 TABLET | Refills: 3 | Status: SHIPPED | OUTPATIENT
Start: 2024-11-18

## 2025-02-13 ENCOUNTER — APPOINTMENT (OUTPATIENT)
Dept: VASCULAR SURGERY | Facility: CLINIC | Age: 85
End: 2025-02-13
Payer: MEDICARE

## 2025-02-26 ENCOUNTER — APPOINTMENT (OUTPATIENT)
Dept: VASCULAR SURGERY | Facility: CLINIC | Age: 85
End: 2025-02-26
Payer: MEDICARE

## 2025-02-26 VITALS
HEIGHT: 62 IN | DIASTOLIC BLOOD PRESSURE: 76 MMHG | SYSTOLIC BLOOD PRESSURE: 134 MMHG | BODY MASS INDEX: 32.76 KG/M2 | WEIGHT: 178 LBS | HEART RATE: 96 BPM | OXYGEN SATURATION: 96 %

## 2025-02-26 DIAGNOSIS — I26.99 OTHER PULMONARY EMBOLISM WITHOUT ACUTE COR PULMONALE, UNSPECIFIED CHRONICITY (MULTI): ICD-10-CM

## 2025-02-26 DIAGNOSIS — M79.89 LEFT LEG SWELLING: ICD-10-CM

## 2025-02-26 DIAGNOSIS — I82.402 DEEP VEIN THROMBOSIS (DVT) OF LEFT LOWER EXTREMITY, UNSPECIFIED CHRONICITY, UNSPECIFIED VEIN (MULTI): ICD-10-CM

## 2025-02-26 DIAGNOSIS — I87.329 STASIS DERMATITIS OF LOWER EXTREMITY DUE TO PERIPHERAL VENOUS HYPERTENSION: Primary | ICD-10-CM

## 2025-02-26 PROCEDURE — 99214 OFFICE O/P EST MOD 30 MIN: CPT | Performed by: SURGERY

## 2025-02-26 PROCEDURE — 1159F MED LIST DOCD IN RCRD: CPT | Performed by: SURGERY

## 2025-02-26 PROCEDURE — 1160F RVW MEDS BY RX/DR IN RCRD: CPT | Performed by: SURGERY

## 2025-02-26 NOTE — PROGRESS NOTES
"Fortino Lopez is a 84 y.o. female     Subjective   This patient presents today for follow-up of her venous issues of her lower extremities.  She states that she still has some swelling involving her left lower leg.  She quit smoking in 1979.  She is 5 foot 2 and weighs 178 pounds.  Blood pressure is normal.  She does wear her compression stockings on a consistent basis.  She is currently 84 years old and still functions relatively well.  She does not have diabetes.  She denies any fever chills nausea vomiting or headache.  It is hard for her to exercise on a consistent basis.         Objective     Vitals:    02/26/25 0900   BP: 134/76   Pulse: 96   SpO2: 96%      Physical Exam  This patient is alert and oriented x 3.  She is in no acute distress.  Head is normocephalic.  Pupils are equal and reactive to light and accommodation.  Neck is soft and supple without palpable lymph nodes.  Heart is regular rate.  Lungs are relatively clear.  Abdomen is soft with positive bowel sounds there is no pain on palpation.  Upper extremities seem to have adequate range of motion.  Her lower extremities have some decreased range of motion left greater than right.  She does have palpable brachial radial and femoral pulses.  She does have palpable pedal pulses.  She has stasis dermatitis left lower extremity greater than right.  Her left calf volume is slightly larger than her right.  It is also somewhat tense compared to her right calf.  Psychologically she appears to be acting appropriately.  Blood pressure 134/76, pulse 96, height 1.575 m (5' 2\"), weight 80.7 kg (178 lb), SpO2 96%.            Patient Active Problem List    Diagnosis Date Noted    Bakers cyst 09/16/2023    DVT (deep venous thrombosis) (Multi) 09/16/2023    Left leg swelling 09/16/2023    Obesity 09/16/2023    Phlebitis 09/16/2023    Stasis dermatitis of lower extremity due to peripheral venous hypertension 09/16/2023    Pulmonary embolism 09/16/2023    "       Current Outpatient Medications:     acetaminophen (Tylenol) 325 mg tablet, Take 2 tablets (650 mg) by mouth every 4 hours if needed for fever (temp greater than 38.0 C)., Disp: , Rfl:     multivitamin tablet, Take 0.5 tablets by mouth twice a day., Disp: , Rfl:     vitamins B1 B6 B12 liquid, Take by mouth., Disp: , Rfl:     Xarelto 20 mg tablet, TAKE ONE TABLET BY MOUTH DAILY, Disp: 30 tablet, Rfl: 3     Lab Results   Component Value Date    WBC 10.2 10/16/2024    HGB 13.5 10/16/2024    HCT 41.1 10/16/2024     10/16/2024    ALT 15 10/16/2024    AST 16 10/16/2024     10/16/2024    K 3.8 10/16/2024     10/16/2024    CREATININE 0.59 10/16/2024    BUN 15 10/16/2024    CO2 24 10/16/2024    INR 1.1 10/16/2024       ECG 12 lead    Result Date: 10/29/2024  Sinus tachycardia Left ventricular hypertrophy Inferior infarct, old See ED provider note for full interpretation and clinical correlation Confirmed by Hilary Faust ([id number]) on 10/29/2024 10:43:31 AM    CT abdomen pelvis w IV contrast    Result Date: 10/17/2024  Interpreted By:  Cecelia Alvarado, STUDY: CT ABDOMEN PELVIS W IV CONTRAST;  10/17/2024 12:51 am   INDICATION: Signs/Symptoms:abdominal pain.     COMPARISON: CT of the abdomen and pelvis dated 11/23/2007.   ACCESSION NUMBER(S): [id number]   ORDERING CLINICIAN: OSCAR CARR   TECHNIQUE: Contiguous axial images of the abdomen and pelvis were obtained after the intravenous administration of 75 mL of Omnipaque 350 iodinated contrast. Coronal and sagittal reformatted images were reconstructed from the axial data.   FINDINGS: LOWER CHEST: Please refer to separately dictated same day CT PE for further characterization thoracic findings.     ABDOMEN/PELVIS:   ABDOMINAL WALL: There is diastasis of the rectus abdominis muscles in the lower abdomen with ventral abdominal wall hernia. No acute abnormality is identified in the cutaneous tissues of the abdomen and pelvis.   LIVER: No acute  hepatic abnormality is present. Hypodense lesion in segment 4A of the liver is similar in appearance to prior exam in 2007, likely representing a cyst or hemangioma.   Portal vein is unremarkable in appearance.   BILE DUCTS: No intrahepatic or extrahepatic biliary dilatation is present.   GALLBLADDER: Gallbladder is nondistended and contains radiopaque material, likely representing stones. No wall thickening or pericholecystic stranding is identified.   PANCREAS: No pancreatic ductal dilatation or peripancreatic stranding is present.   SPLEEN: Spleen is unremarkable in appearance.   ADRENALS: Bilateral adrenal glands are unremarkable in appearance.   KIDNEYS, URETERS, BLADDER: Kidneys are symmetric in size and enhancement without evidence of hydronephrosis. Evaluation for stones is significantly limited due to positive contrast within the ureters and calices.   Several exophytic cysts are present in the left kidney, largest measuring up to 10.8 cm in size, significantly increased from 5.4 cm in 2007 mm several exophytic cysts also arise from the mid and lower pole, also increased in size from 2007.   No bladder wall thickening is identified.   REPRODUCTIVE ORGANS: Uterus is surgically absent. No adnexal masses or fluid collections are present.   VESSELS: Mild atherosclerotic plaques are present in the abdominal aorta. No acute vascular abnormality is identified.   RETROPERITONEUM/LYMPH NODES: No acute retroperitoneal abnormality. No enlarged lymph nodes.   BOWEL/MESENTERY/PERITONEUM: Moderate volume of solid stool is present throughout the colon. There are scattered diverticula in the descending and sigmoid colon without evidence of acute diverticulitis. No inflammatory bowel wall thickening or dilatation. Normal appendix.   No ascites, free air, or fluid collection.     MUSCULOSKELETAL: No acute osseous abnormality. No suspicious osseous lesion. Multilevel degenerative changes are present in the lumbar spine, with  a advanced degenerate facet osteoarthropathy noted at L4-L5 and L5-S1.       1. No acute abnormality within the abdomen or pelvis. 2. Moderate volume of solid stool is present in the in the large bowel, most pronounced in the cecum and ascending colon. Correlate with any symptoms of constipation. 3. Several exophytic cysts are present in the kidneys bilaterally, more pronounced in the left, significantly increased in size from prior exam in 2007, largest measuring up to 10.5 cm in size.   MACRO: None.   Signed by: Cecelia Alvarado 10/17/2024 1:19 AM Dictation workstation:   [id number]    CT angio chest for pulmonary embolism    Result Date: 10/17/2024  Interpreted By:  Cecelia Alvarado, STUDY: CT ANGIO CHEST FOR PULMONARY EMBOLISM;  10/17/2024 12:51 am   INDICATION: Signs/Symptoms:Chest pain.     COMPARISON: CT PE dated 02/03/2020.   ACCESSION NUMBER(S): [id number]   ORDERING CLINICIAN: OSCAR CARR   TECHNIQUE: Helical data acquisition of the chest was obtained after intravenous administration of 75 mL Omnipaque 350, as per PE protocol. Images were reformatted in coronal and sagittal planes. Axial and coronal maximum intensity projection (MIP) images were created and reviewed.   FINDINGS: POTENTIAL LIMITATIONS OF THE STUDY: Study is somewhat degraded by beam hardening artifact from the contrast bolus in the left subclavian vein as well as motion.   HEART AND VESSELS: There are no discrete filling defects within main pulmonary artery and its branches to suggest acute pulmonary embolism. Main pulmonary artery and its branches are upper limits of normal caliber.   The thoracic aorta normal in course and caliber.Mild vascular calcifications are present in the thoracic aorta.Although, the study is not tailored for evaluation of aorta, there is no definite evidence of acute aortic pathology. Mild-to-moderate coronary artery calcifications are seen. Please note, the study is not optimized for evaluation of  coronary arteries.   Heart is borderline enlarged.There are no findings to suggest right heart strain. There is no pericardial effusion seen.   MEDIASTINUM AND HARMAN, LOWER NECK AND AXILLA: The visualized thyroid gland is within normal limits. No evidence of thoracic lymphadenopathy by CT criteria. Esophagus appears within normal limits as seen.   LUNGS AND AIRWAYS: The trachea and central airways are patent. No endobronchial lesion is seen.   Atelectasis/scarring in the lower lobes and lingula is similar in appearance to prior exam in February of 2020, without evidence of consolidation, pleural effusion or pneumothorax.   UPPER ABDOMEN: Please refer to separately dictated same day CT of the abdomen and pelvis for further characterization of the abdominal findings.   CHEST WALL AND OSSEOUS STRUCTURES: Chest wall is within normal limits. No acute osseous pathology.There are no suspicious osseous lesions.Multilevel degenerative changes are present in the thoracic spine, without evidence of new compression fracture or high-grade stenosis.       1. No evidence of acute pulmonary embolism. 2. Atelectatic changes are present in the lower lobes bilaterally without evidence of consolidation, pleural effusion or other acute cardiopulmonary abnormality. 3. Mild-to-moderate coronary artery calcifications.   MACRO: None   Signed by: Cecelia Alvarado 10/17/2024 1:10 AM Dictation workstation:   [id number]    XR chest 1 view    Result Date: 10/17/2024  Interpreted By:  Finkelstein, Evan, STUDY: XR CHEST 1 VIEW;  10/16/2024 11:55 pm   INDICATION: Signs/Symptoms:Chest pain.     COMPARISON: Chest radiograph 02/05/2020   ACCESSION NUMBER(S): [id number]   ORDERING CLINICIAN: OSCAR CARR   FINDINGS:     CARDIOMEDIASTINAL SILHOUETTE: Cardiomediastinal silhouette is stable in size and configuration.   LUNGS: No pulmonary consolidation, pleural effusion or pneumothorax.   ABDOMEN: No remarkable upper abdominal findings.   BONES:  No acute osseous abnormality.       No radiographic evidence of acute cardiopulmonary pathology.   MACRO: None.   Signed by: Evan Finkelstein 10/17/2024 12:13 AM Dictation workstation:   [id number]                Assessment/Plan   Assessment & Plan  Stasis dermatitis of lower extremity due to peripheral venous hypertension    Deep vein thrombosis (DVT) of left lower extremity, unspecified chronicity, unspecified vein (Multi)    Left leg swelling    Other pulmonary embolism without acute cor pulmonale, unspecified chronicity (Multi)      This patient presents today for follow-up of her venous issues of her lower extremities.  She continues to have some swelling involving her left calf/lower leg.  She did have a venous duplex scan with reflux study back in August of last year.  This showed segmental reflux involving her right greater saphenous vein.  She does have some reflux in her deep system involving her left leg involving the proximal superficial femoral vein and midportion of the vein.  She also has some reflux and left popliteal vein.  I am very much encouraging her to exercise her lower extremities on a daily basis.  I do feel a nonweightbearing exercise would be appropriate for her.  She also needs to periodically elevate her legs above her heart and to wear her compression stockings on a consistent basis.  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  I would like her to follow-up in 3 months.  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  She also has a history of pulmonary embolism.      Salty Vilchis, DO

## 2025-03-27 DIAGNOSIS — I82.4Y9 DEEP VEIN THROMBOSIS (DVT) OF PROXIMAL LOWER EXTREMITY, UNSPECIFIED CHRONICITY, UNSPECIFIED LATERALITY: ICD-10-CM

## 2025-03-27 RX ORDER — RIVAROXABAN 20 MG/1
20 TABLET, FILM COATED ORAL DAILY
Qty: 90 TABLET | Refills: 3 | Status: SHIPPED | OUTPATIENT
Start: 2025-03-27

## 2025-05-21 ENCOUNTER — APPOINTMENT (OUTPATIENT)
Dept: VASCULAR SURGERY | Facility: CLINIC | Age: 85
End: 2025-05-21
Payer: MEDICARE

## 2025-05-21 VITALS
HEART RATE: 74 BPM | BODY MASS INDEX: 32.94 KG/M2 | HEIGHT: 62 IN | OXYGEN SATURATION: 97 % | SYSTOLIC BLOOD PRESSURE: 140 MMHG | DIASTOLIC BLOOD PRESSURE: 80 MMHG | WEIGHT: 179 LBS

## 2025-05-21 DIAGNOSIS — I82.402 DEEP VEIN THROMBOSIS (DVT) OF LEFT LOWER EXTREMITY, UNSPECIFIED CHRONICITY, UNSPECIFIED VEIN: ICD-10-CM

## 2025-05-21 DIAGNOSIS — M79.89 LEFT LEG SWELLING: ICD-10-CM

## 2025-05-21 DIAGNOSIS — I26.99 PULMONARY EMBOLISM WITHOUT ACUTE COR PULMONALE, UNSPECIFIED CHRONICITY, UNSPECIFIED PULMONARY EMBOLISM TYPE (MULTI): ICD-10-CM

## 2025-05-21 DIAGNOSIS — I87.329 STASIS DERMATITIS OF LOWER EXTREMITY DUE TO PERIPHERAL VENOUS HYPERTENSION: Primary | ICD-10-CM

## 2025-05-21 DIAGNOSIS — I80.9 PHLEBITIS: ICD-10-CM

## 2025-05-21 PROCEDURE — 1159F MED LIST DOCD IN RCRD: CPT | Performed by: SURGERY

## 2025-05-21 PROCEDURE — 99213 OFFICE O/P EST LOW 20 MIN: CPT | Performed by: SURGERY

## 2025-05-21 PROCEDURE — 1160F RVW MEDS BY RX/DR IN RCRD: CPT | Performed by: SURGERY

## 2025-05-23 NOTE — PROGRESS NOTES
"Fortino Lopez is a 84 y.o. female     Subjective   This patient presents today for follow-up of her venous issues of her lower extremities.  She states that her legs are about the same and doing relatively well.  She does still get some mild swelling by the end of the day.  She is currently 84 years old.  She is 5 foot 2 and weighs 179 pounds.  She does have a history of a DVT of the left leg.  She also has a history of a pulmonary embolism.  She continues to take Xarelto.  She did quit smoking in 1979.  She does wear her compression stockings on a fairly consistent basis.  She denies any nausea vomiting fever chills or headache.  It is harder for her to exercise on a consistent basis.         Objective     Vitals:    05/21/25 0900   BP: 140/80   Pulse: 74   SpO2: 97%      Physical Exam  This patient is alert and oriented x 3.  She is in no acute distress.  Head is normocephalic.  Pupils are equal and reactive to light and accommodation.  Neck is soft and supple without palpable lymph nodes.  Heart is regular rate.  Lungs are relatively clear.  Abdomen is somewhat obese with positive bowel sounds there is no pain.  Upper extremities seem to have adequate range of motion.  Her lower extremities have some decreased range of motion.  She does have palpable brachial radial femoral and popliteal pulses.  She does have stasis dermatitis of both lower legs left greater than right.  She also has a larger volume involving her left leg versus right.  However, her calf compartments are relatively soft.  Psychologically she seems to be acting appropriately for her situation.  Blood pressure 140/80, pulse 74, height 1.575 m (5' 2\"), weight 81.2 kg (179 lb), SpO2 97%.            Patient Active Problem List    Diagnosis Date Noted    Bakers cyst 09/16/2023    DVT (deep venous thrombosis) (Multi) 09/16/2023    Left leg swelling 09/16/2023    Obesity 09/16/2023    Phlebitis 09/16/2023    Stasis dermatitis of lower extremity due " to peripheral venous hypertension 09/16/2023    Pulmonary embolism 09/16/2023        Current Medications[1]     Lab Results   Component Value Date    WBC 10.2 10/16/2024    HGB 13.5 10/16/2024    HCT 41.1 10/16/2024     10/16/2024    ALT 15 10/16/2024    AST 16 10/16/2024     10/16/2024    K 3.8 10/16/2024     10/16/2024    CREATININE 0.59 10/16/2024    BUN 15 10/16/2024    CO2 24 10/16/2024    INR 1.1 10/16/2024       ECG 12 lead  Result Date: 10/29/2024  Sinus tachycardia Left ventricular hypertrophy Inferior infarct, old See ED provider note for full interpretation and clinical correlation Confirmed by Hilary Faust ([id number]) on 10/29/2024 10:43:31 AM    CT abdomen pelvis w IV contrast  Result Date: 10/17/2024  Interpreted By:  Cecelia Alvarado, STUDY: CT ABDOMEN PELVIS W IV CONTRAST;  10/17/2024 12:51 am   INDICATION: Signs/Symptoms:abdominal pain.     COMPARISON: CT of the abdomen and pelvis dated 11/23/2007.   ACCESSION NUMBER(S): [id number]   ORDERING CLINICIAN: OSCAR CARR   TECHNIQUE: Contiguous axial images of the abdomen and pelvis were obtained after the intravenous administration of 75 mL of Omnipaque 350 iodinated contrast. Coronal and sagittal reformatted images were reconstructed from the axial data.   FINDINGS: LOWER CHEST: Please refer to separately dictated same day CT PE for further characterization thoracic findings.     ABDOMEN/PELVIS:   ABDOMINAL WALL: There is diastasis of the rectus abdominis muscles in the lower abdomen with ventral abdominal wall hernia. No acute abnormality is identified in the cutaneous tissues of the abdomen and pelvis.   LIVER: No acute hepatic abnormality is present. Hypodense lesion in segment 4A of the liver is similar in appearance to prior exam in 2007, likely representing a cyst or hemangioma.   Portal vein is unremarkable in appearance.   BILE DUCTS: No intrahepatic or extrahepatic biliary dilatation is present.   GALLBLADDER:  Gallbladder is nondistended and contains radiopaque material, likely representing stones. No wall thickening or pericholecystic stranding is identified.   PANCREAS: No pancreatic ductal dilatation or peripancreatic stranding is present.   SPLEEN: Spleen is unremarkable in appearance.   ADRENALS: Bilateral adrenal glands are unremarkable in appearance.   KIDNEYS, URETERS, BLADDER: Kidneys are symmetric in size and enhancement without evidence of hydronephrosis. Evaluation for stones is significantly limited due to positive contrast within the ureters and calices.   Several exophytic cysts are present in the left kidney, largest measuring up to 10.8 cm in size, significantly increased from 5.4 cm in 2007 mm several exophytic cysts also arise from the mid and lower pole, also increased in size from 2007.   No bladder wall thickening is identified.   REPRODUCTIVE ORGANS: Uterus is surgically absent. No adnexal masses or fluid collections are present.   VESSELS: Mild atherosclerotic plaques are present in the abdominal aorta. No acute vascular abnormality is identified.   RETROPERITONEUM/LYMPH NODES: No acute retroperitoneal abnormality. No enlarged lymph nodes.   BOWEL/MESENTERY/PERITONEUM: Moderate volume of solid stool is present throughout the colon. There are scattered diverticula in the descending and sigmoid colon without evidence of acute diverticulitis. No inflammatory bowel wall thickening or dilatation. Normal appendix.   No ascites, free air, or fluid collection.     MUSCULOSKELETAL: No acute osseous abnormality. No suspicious osseous lesion. Multilevel degenerative changes are present in the lumbar spine, with a advanced degenerate facet osteoarthropathy noted at L4-L5 and L5-S1.       1. No acute abnormality within the abdomen or pelvis. 2. Moderate volume of solid stool is present in the in the large bowel, most pronounced in the cecum and ascending colon. Correlate with any symptoms of constipation. 3.  Several exophytic cysts are present in the kidneys bilaterally, more pronounced in the left, significantly increased in size from prior exam in 2007, largest measuring up to 10.5 cm in size.   MACRO: None.   Signed by: Cecelia Alvarado 10/17/2024 1:19 AM Dictation workstation:   [id number]    CT angio chest for pulmonary embolism  Result Date: 10/17/2024  Interpreted By:  Cecelia Alvarado, STUDY: CT ANGIO CHEST FOR PULMONARY EMBOLISM;  10/17/2024 12:51 am   INDICATION: Signs/Symptoms:Chest pain.     COMPARISON: CT PE dated 02/03/2020.   ACCESSION NUMBER(S): [id number]   ORDERING CLINICIAN: OSCAR CARR   TECHNIQUE: Helical data acquisition of the chest was obtained after intravenous administration of 75 mL Omnipaque 350, as per PE protocol. Images were reformatted in coronal and sagittal planes. Axial and coronal maximum intensity projection (MIP) images were created and reviewed.   FINDINGS: POTENTIAL LIMITATIONS OF THE STUDY: Study is somewhat degraded by beam hardening artifact from the contrast bolus in the left subclavian vein as well as motion.   HEART AND VESSELS: There are no discrete filling defects within main pulmonary artery and its branches to suggest acute pulmonary embolism. Main pulmonary artery and its branches are upper limits of normal caliber.   The thoracic aorta normal in course and caliber.Mild vascular calcifications are present in the thoracic aorta.Although, the study is not tailored for evaluation of aorta, there is no definite evidence of acute aortic pathology. Mild-to-moderate coronary artery calcifications are seen. Please note, the study is not optimized for evaluation of coronary arteries.   Heart is borderline enlarged.There are no findings to suggest right heart strain. There is no pericardial effusion seen.   MEDIASTINUM AND HARMAN, LOWER NECK AND AXILLA: The visualized thyroid gland is within normal limits. No evidence of thoracic lymphadenopathy by CT criteria.  Esophagus appears within normal limits as seen.   LUNGS AND AIRWAYS: The trachea and central airways are patent. No endobronchial lesion is seen.   Atelectasis/scarring in the lower lobes and lingula is similar in appearance to prior exam in February of 2020, without evidence of consolidation, pleural effusion or pneumothorax.   UPPER ABDOMEN: Please refer to separately dictated same day CT of the abdomen and pelvis for further characterization of the abdominal findings.   CHEST WALL AND OSSEOUS STRUCTURES: Chest wall is within normal limits. No acute osseous pathology.There are no suspicious osseous lesions.Multilevel degenerative changes are present in the thoracic spine, without evidence of new compression fracture or high-grade stenosis.       1. No evidence of acute pulmonary embolism. 2. Atelectatic changes are present in the lower lobes bilaterally without evidence of consolidation, pleural effusion or other acute cardiopulmonary abnormality. 3. Mild-to-moderate coronary artery calcifications.   MACRO: None   Signed by: Cecelia Alvarado 10/17/2024 1:10 AM Dictation workstation:   [id number]    XR chest 1 view  Result Date: 10/17/2024  Interpreted By:  Finkelstein, Evan, STUDY: XR CHEST 1 VIEW;  10/16/2024 11:55 pm   INDICATION: Signs/Symptoms:Chest pain.     COMPARISON: Chest radiograph 02/05/2020   ACCESSION NUMBER(S): [id number]   ORDERING CLINICIAN: OSCAR CARR   FINDINGS:     CARDIOMEDIASTINAL SILHOUETTE: Cardiomediastinal silhouette is stable in size and configuration.   LUNGS: No pulmonary consolidation, pleural effusion or pneumothorax.   ABDOMEN: No remarkable upper abdominal findings.   BONES: No acute osseous abnormality.       No radiographic evidence of acute cardiopulmonary pathology.   MACRO: None.   Signed by: Evan Finkelstein 10/17/2024 12:13 AM Dictation workstation:   [id number]                Assessment/Plan   Assessment & Plan      This patient presents today for follow-up of  her venous issues of her lower extremities.  She does have history of deep vein thrombosis left leg and pulmonary embolism.  She quit smoking in 1979.  She continues to take Xarelto.  She does wear her compression stockings on a relatively consistent basis.  It is hard for her to exercise her lower extremities on a consistent basis.  Her left lower leg volume is greater than her right.  She still gets some swelling by the end of the day.  Overall, there is no significant change from previous visit.  I am encouraging her to elevate her legs above her heart when resting.  This should help her daily swelling.  I would like her to follow-up in 6 months.      Salty Vilchis, DO        [1]   Current Outpatient Medications:     acetaminophen (Tylenol) 325 mg tablet, Take 2 tablets (650 mg) by mouth every 4 hours if needed for fever (temp greater than 38.0 C)., Disp: , Rfl:     multivitamin tablet, Take 0.5 tablets by mouth twice a day., Disp: , Rfl:     vitamins B1 B6 B12 liquid, Take by mouth., Disp: , Rfl:     Xarelto 20 mg tablet, TAKE ONE TABLET BY MOUTH DAILY, Disp: 90 tablet, Rfl: 3

## 2025-11-19 ENCOUNTER — APPOINTMENT (OUTPATIENT)
Dept: VASCULAR SURGERY | Facility: CLINIC | Age: 85
End: 2025-11-19
Payer: MEDICARE